# Patient Record
Sex: FEMALE | Race: WHITE
[De-identification: names, ages, dates, MRNs, and addresses within clinical notes are randomized per-mention and may not be internally consistent; named-entity substitution may affect disease eponyms.]

---

## 2018-04-25 ENCOUNTER — HOSPITAL ENCOUNTER (INPATIENT)
Dept: HOSPITAL 68 - ERH | Age: 58
LOS: 2 days | DRG: 754 | End: 2018-04-27
Attending: PSYCHIATRY & NEUROLOGY | Admitting: PSYCHIATRY & NEUROLOGY
Payer: COMMERCIAL

## 2018-04-25 VITALS — SYSTOLIC BLOOD PRESSURE: 135 MMHG | DIASTOLIC BLOOD PRESSURE: 79 MMHG

## 2018-04-25 VITALS — SYSTOLIC BLOOD PRESSURE: 145 MMHG | DIASTOLIC BLOOD PRESSURE: 91 MMHG

## 2018-04-25 VITALS — HEIGHT: 65 IN | BODY MASS INDEX: 20.7 KG/M2 | WEIGHT: 124.25 LBS

## 2018-04-25 VITALS — SYSTOLIC BLOOD PRESSURE: 133 MMHG | DIASTOLIC BLOOD PRESSURE: 70 MMHG

## 2018-04-25 VITALS — DIASTOLIC BLOOD PRESSURE: 84 MMHG | SYSTOLIC BLOOD PRESSURE: 135 MMHG

## 2018-04-25 VITALS — SYSTOLIC BLOOD PRESSURE: 135 MMHG | DIASTOLIC BLOOD PRESSURE: 84 MMHG

## 2018-04-25 VITALS — DIASTOLIC BLOOD PRESSURE: 91 MMHG | SYSTOLIC BLOOD PRESSURE: 145 MMHG

## 2018-04-25 DIAGNOSIS — F32.9: Primary | ICD-10-CM

## 2018-04-25 DIAGNOSIS — Z72.89: ICD-10-CM

## 2018-04-25 LAB
ABSOLUTE GRANULOCYTE CT: 5.4 /CUMM (ref 1.4–6.5)
BASOPHILS # BLD: 0.1 /CUMM (ref 0–0.2)
BASOPHILS NFR BLD: 0.8 % (ref 0–2)
EOSINOPHIL # BLD: 0.1 /CUMM (ref 0–0.7)
EOSINOPHIL NFR BLD: 0.6 % (ref 0–5)
GRANULOCYTES NFR BLD: 61.5 % (ref 42.2–75.2)
HCT VFR BLD CALC: 42 % (ref 37–47)
LYMPHOCYTES # BLD: 3 /CUMM (ref 1.2–3.4)
MONOCYTES # BLD: 0.3 /CUMM (ref 0.1–0.6)
PLATELET # BLD: 496 /CUMM (ref 130–400)
PMV BLD AUTO: 6.5 FL (ref 7.4–10.4)
WBC # BLD AUTO: 8.8 /CUMM (ref 4.8–10.8)

## 2018-04-25 PROCEDURE — G0480 DRUG TEST DEF 1-7 CLASSES: HCPCS

## 2018-04-25 NOTE — IP CRISIS DIAG ASSESS PSYCH
Diagnostic Assessment
 
Basic Assessment
Insurance Authorization:
Insurance #1:
 
Insurance name: SELF-PAY
 
Phone number: 
 
Policy number: [id number]
Group number: 
Authorization number:    submitted.
 
 
Primary Care Physician:
Patient's PCP: Bernard Johnsno MD
PCP's Phone Number: [phone]
 
Patient's Quote: "I am screwed financially"
Present Illness:
Patient is a 57 year old , who called ambulance after she had taken Chantix
(she thinks 60 tabs) and 2  percocets, after having had a considerable amount to
drink in order to "get up the courage to take the pills. Patient staes that she 
is, and has been, financilly stressed for a number of years, and that her 
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 
care, is going to be reporting her income to the IRS, after having agreed that 
she would not report such income.  Patient reports that she just barely gets by 
on the money that she has, and that she has no insurance, and has no idea how 
she can pay the IRS.    Patient lives alone, since her  had  about 15
years ago. Patient has minimal pension from  ($200.00/month), and has a 
number of physical problems including serious pain, osteoporosis, and pain from 
a broken arm years ago. Patient states that she sees herself in a wheelchair in 
the future, and indicates that she has no one to help her, nor provide any real 
support.
Patient reports having had 2 relationships since   when she was 38, 
but states that "one broke my heart, and the other was not a good match". 
Patient states that gradually her friends drifted away after husbands death, as 
they were couples. She also states that dating world is not "a good place for 
someone like me to be".  Patient states that her family are involved with their 
own problems, and she is horrified that they will find out, and does not want to
cause them any problems.
Patient had seen a psychiatrist after her   suddenly from a heart 
attack, and was on anti-depressant. She improved over a couple years and 
stopped.  No other psychiatric treatment.
Patient had some physical pain, and was on percocet and fentanyl, but weaned 
self off.  Patient states that she does have 2 drinks (vodka) many nights, and 
this has occurred over the past 7 years. Patient denies any other substance use,
stating that she had had 2 "left over percocets" that she took last night.
Patient is fully alert and oriented x 3.
Patient's Address:
[address] [place] [address]
Home Phone Number: [phone]
Other Phone Number: 
 
Who Do You Live With? Patient/Self
Feel Safe Where You Live? Yes
Feel Safe in Your Relationship Yes
Marital Status: 
Do You Have Children? No
Primary Language? English
Language(s) Spoken At Home: English
Family/Informants Interviewed: brother, Luis Fuller
Allergies -
Coded Allergies:
Iodinated Contrast- Oral and IV Dye (UNKNOWN 18)
amoxicillin (UNKNOWN 18)
niacin (FELT LIKE BODY ON FIRE FOR HOURS, FAINTING 18)
 
Current Medications -
Scheduled Medications
Ascorbic Acid (Vitamin C) 500 MG TAB   1 TAB PO DAILY SUPPLEMENT  (Reported)
     Entered as Reported by Daja Catherine on 12/29/15 2148
Calcium Carbonate (Calcium 600) 600 MG TAB   2,000 MG PO DAILY SUPPLEMENT  (
Reported)
     Entered as Reported by Daja Catherine on 12/29/15 2148
Cyanocobalamin (Vitamin B-12) (Unknown Strength) TAB   (Unknown Dose) PO DAILY 
SUPPLEMENT  (Reported)
     Entered as Reported by Daja Catherine on 12/29/15 2147
Magnesium (Unknown Strength) TAB   (Unknown Dose) PO DAILY SUPPLEMENT  (Reported
)
     Entered as Reported by Daja Catherine on 12/29/15 2147
 
Scheduled PRN Medications
Albuterol Sulfate (Albuterol Sulfate Hfa) 0.09 MG/Actuation JOSEPH   2 PUFF INH Q4-
6 PRN PRN SHORTNESS OF BREATH #1 JOSEPH
     Prescribed by Holley Oh on 16
 
Consequences of Psych Med Use:
not on meds
Lab Results:
 Laboratory Tests
 
18 0335:
Urine Opiates Screen 963.00, Methadone Screen < 40, Barbiturate Screen < 60, Ur 
Phencyclidine Scrn < 6.00, Amphetamines Screen < 100, U Benzodiazepines Scrn < 
85, Urine Cocaine Screen < 50, Urine Cannabis Screen 33.00, Urine Color YEL, 
Urine Clarity CLEAR, Urine pH 6.0, Ur Specific Gravity >= 1.030, Urine Protein 
NEG, Urine Ketones 15  H, Urine Nitrite NEG, Urine Bilirubin NEG, Urine 
Urobilinogen 0.2, Ur Leukocyte Esterase NEG, Ur Microscopic EXAM NOT REQUIRED, 
Urine Hemoglobin NEG, Urine Glucose NEG
 
18 0210:
Anion Gap 23  H, Estimated GFR > 60, BUN/Creatinine Ratio 20.0, Glucose 129  H, 
Hemoglobin A1c 5.1, Calcium 9.5, Total Bilirubin 0.8, AST 28, ALT 20, Alkaline 
Phosphatase 95, Troponin I < 0.01, Total Protein 7.6, Albumin 4.9, Globulin 2.7,
Albumin/Globulin Ratio 1.8, Triglycerides 440  H, Cholesterol 152, LDL 
Cholesterol Direct 46.22, LDL Cholesterol, Calc ND, HDL Cholesterol 55, 
Cholesterol/HDL Ratio 3, TSH &T3 &Free T4 Intrp 2.050, CBC w Diff NO MAN DIFF 
REQ, MPV 6.5  L, Gran % 61.5, Lymphocytes % 33.8, Monocytes % 3.3, Eosinophils %
0.6, Basophils % 0.8, Absolute Granulocytes 5.4, Absolute Lymphocytes 3.0, 
Absolute Monocytes 0.3, Absolute Eosinophils 0.1, Absolute Basophils 0.1, 
Acetaminophen < 10.0  L, Serum Alcohol 230.0
 
Toxicology Screen Completed? Yes
Results: positive
Symptoms of Use:
patient drinks 4-5 nights per week.
 
patient also took 2 percocets from previous prescription
 
Past History
 
Past Medical History
Medical History: osteoporsis 
 
Abuse/Trauma History
Trauma History/Current Trauma: Denies
 
Legal History
Current Legal Status: none
Have you ever been arrested? No
Number of Arrests: 0
 
Psychosocial History
Strengths/Capabilities:
patient has been employed for many years
patient has been self reliant
Physical Limitations (Interventions):
has mobility issues
 
Psychiatric Treatment History
Psych Treatment
   Psychiatric Treatment Yes
   Inpatient Treatment No
   Outpatient Treatment Yes
   Location of Treatment [place]
   Reason for Treatment
husbands sudden death.
   Dates of Treatment 8524-3177
   Response to Treatment
good
Diagnosis by History:
depression
 adjustment issues
Risk Factors: access to lethal means, high anxiety/distress, isolate/no social 
support, lives alone, limited support
 
Substance Use/Abuse History
Drug Use/Abuse minimum 12mo Hx
   Substances Used/Abused Yes
   Substance Used/Abused Alcohol
   First Use 16
   Last Used yesterday
   How much used/taken patient states has 2 drinks vodka at night, 4-5 nights/
wk.
   How often 4-5 nights/wk.
   For how long past 7 years
   Route of use p.o.
 
Substance Abuse Treatment
Substance Abuse Treatment
   Past Substance Abuse [place] No
 
Sexual History
Sexually Active No
Sexual Orientation Heterosexual
 
Current Mental Status
 
Mental Status
Orientation: Person, Place, Situation
Affect: Anxious, Sad
Speech: WNL
Neuro-vegetative: WNL
 
Appearance
Appearance- Dress/Hygiene:
appears stated age
 
Behaviors
Thought Process: Logical/Rational
Thought Content: WNL
Memory: WNL
Insight: Fair
 
SI/HI Risk Assessment
- Minimum 6mo History-
Past Suicidal Ideation/Attempts No
Current Suicidal Ideation/Att Yes
Past Homicidal Ideation/Att: No
Current Homicidal Ideation/Attempts No
Degree of Intent: Made Preparations (took overdose)
Danger To: Self
Risk Factors: access to lethal means, high anxiety/distress, isolate/no social 
support, lives alone, limited support
Lethality Rating: 3
Needs/Init [place] Plan/Goals:
Admit to [facility] due to overdose. Danger to self
Medication and psychiatric evaluation.
Group and individual therapy
Arrange family meeting, although patient is resistant
at present
assist patient with insurance application
coordinate follow-up treatment, with consideration of financial situation and 
obligations.
AUDIT-C Questionnaire:
 
 
AUDIT-C Questionnaire: Response Value
 
ETOH use in the past year 4 or more per week 4
 
# drinks typical/day 3 or 4 1
 
6 or > drinks per occasion Less than monthly 1
 
Total   6
 
 
 
DSM5/PS Stressors/Medical Prob
Diagnosis' (DSM 5, Stressors, Medical):
Unspecified Depressive D.O.   F 32.9
Current GAF: 27
Comments:
patient has many stressors

## 2018-04-25 NOTE — ED PSYCHIATRIC COMPLAINT
**See Addendum**
History of Present Illness
 
General
Chief Complaint: Psychiatric Related Complaint
Stated Complaint: SI
Source: patient, old records, EMS
Exam Limitations: no limitations, intoxication
 
Vital Signs & Intake/Output
Vital Signs & Intake/Output
 Vital Signs
 
 
Date Time Temp Pulse Resp B/P B/P Pulse O2 O2 Flow FiO2
 
     Mean Ox Delivery Rate 
 
04/25 0631 97.5 89 16 141/69  100 Room Air  
 
04/25 0430 96.2 66 18 109/63  96 Room Air  
 
04/25 0210       Room Air  
 
04/25 0206 96.4 66 18 126/64  97 Room Air  
 
 
 
Reconcile Medications
Albuterol Sulfate (Albuterol Sulfate Hfa) 0.09 MG/Actuation JOSEPH   2 PUFF INH Q4-
6 PRN PRN SHORTNESS OF BREATH
     90 MCG PER PUFF
Ascorbic Acid (Vitamin C) 500 MG TAB   1 TAB PO DAILY SUPPLEMENT  (Reported)
Calcium Carbonate (Calcium 600) 600 MG TAB   2,000 MG PO DAILY SUPPLEMENT  (
Reported)
Cyanocobalamin (Vitamin B-12) (Unknown Strength) TAB   (Unknown Dose) PO DAILY 
SUPPLEMENT  (Reported)
Magnesium (Unknown Strength) TAB   (Unknown Dose) PO DAILY SUPPLEMENT  (Reported
)
 
Triage Nurses Notes Reviewed? yes
HPI:
Patient took approximately 60 Chantix, 2 Percocet and 1 other pill that she 
doesn't know what it was as well as alcohol in an effort to kill herself.  
Patient states she did this because of income tax and that it is the IRS's 
fault.  Patient denies ever having attempted anything in the past.  Patient 
denies homicidal ideation.
(Pia VIGIL,Abelino CONNOLLY)
Allergies
Coded Allergies:
Iodinated Contrast- Oral and IV Dye (UNKNOWN 04/25/18)
amoxicillin (UNKNOWN 04/25/18)
niacin (FELT LIKE BODY ON FIRE FOR HOURS, FAINTING 04/25/18)
 
(Liu Schuler DO)
 
Past History
 
Travel History
Traveled to [place] past 21 day No
 
Medical History
Any Pertinent Medical History? see below for history
Neurological: NONE
EENT: NONE
Cardiovascular: NONE
Respiratory: asthma, bronchitis
Gastrointestinal: INDIGESTION
Hepatic: NONE
Renal: NONE
Musculoskeletal: fracture, osteoporosis
Psychiatric: alcohol dependence, anxiety, depression
Endocrine: NONE
Blood Disorders: NONE
Cancer(s): NONE
GYN/Reproductive: NONE
Other Medical Hx:
Polymyalgia rheumatica
History of MRSA: No
History of VRE: No
History of CDIFF: No
Influenza Vaccine: 12/30/15
 
Surgical History
Surgical History: left leg plate lumbar surgery neck and back fusion surgery (
Right LE ORIF)
 
Psychosocial History
Who do you live with Patient/Self
Services at Home None
What is your primary language English
Tobacco Use: Current Daily Use
Daily Tobacco Use Amount/Type: => 5 Cigarettes daily
ETOH Use: heavy use
Illicit Drug Use: denies illicit drug use
 
Family History
Hx Contributory? No
(Pia VIGIL,Abelino CONNOLLY)
 
Review of Systems
 
Review of Systems
Constitutional:
Reports: no symptoms. 
EENTM:
Reports: no symptoms. 
Respiratory:
Reports: no symptoms. 
Cardiovascular:
Reports: no symptoms. 
GI:
Reports: see HPI, nausea, vomiting. 
Genitourinary:
Reports: no symptoms. 
Musculoskeletal:
Reports: no symptoms. 
Skin:
Reports: no symptoms. 
Neurological/Psychological:
Reports: see HPI, depressed. 
Hematologic/Endocrine:
Reports: no symptoms. 
Immunologic/Allergic:
Reports: no symptoms. 
All Other Systems: Reviewed and Negative
(Pia VIGIL,Abelino CONNOLLY)
 
Physical Exam
 
Physical Exam
General Appearance: well developed/nourished, mild distress
Head: atraumatic
Eyes:
Bilateral: PERRL, EOMI. 
Ears, Nose, Throat: normal pharynx, normal ENT inspection, hearing grossly 
normal
Neck: normal inspection, supple
Respiratory: normal breath sounds
Cardiovascular: regular rate/rhythm
Gastrointestinal: soft, non-tender
Extremities: normal range of motion
Neurological/Psychiatric: no motor/sensory deficits, awake, alert, oriented x 3
Appearance/Memory/Insight: appropriate appearance, appropriate insight
Behavoir/Eye Contact/Speech: cooperative, normal speech, good eye contact
Thoughts/Hallucinations: normal thought pattern, no apparent hallucination
Skin: intact, normal color, warm/dry
SAD PERSONS Done? CRISIS CONSULT OBTAINED
(Pia VIGIL,Abelino CONNOLLY)
 
Progress
Differential Diagnosis: drug intoxication, drug overdose, drug withdrawal, 
electrolyte abnormality
Plan of Care:
 Orders
 
 
Procedure Date/time Status
 
Regular Diet 04/25 B Active
 
Add-on Test (ER Only) 04/25 0208 Active
 
Continuous Observation Monitor 04/25 0156 Active
 
URINE DRUGS OF ABUSE 04/25 0156 Complete
 
URINALYSIS 04/25 0156 Complete
 
ACETOMINOPHEN 04/25 0156 Complete
 
TROPONIN LEVEL 04/25 0156 Complete
 
ETHANOL 04/25 0156 Complete
 
COMPREHENSIVE METABOLIC PANEL 04/25 0156 Complete
 
CBC WITHOUT DIFFERENTIAL 04/25 0156 Complete
 
EKG 04/25 0156 Active
 
ED CRISIS PSYCH CONSULT 04/25 0156 Active
 
 
 Laboratory Tests
 
 
 
04/25/18 0335:
Urine Opiates Screen 963.00, Methadone Screen < 40, Barbiturate Screen < 60, Ur 
Phencyclidine Scrn < 6.00, Amphetamines Screen < 100, U Benzodiazepines Scrn < 
85, Urine Cocaine Screen < 50, Urine Cannabis Screen 33.00, Urine Color YEL, 
Urine Clarity CLEAR, Urine pH 6.0, Ur Specific Gravity >= 1.030, Urine Protein 
NEG, Urine Ketones 15  H, Urine Nitrite NEG, Urine Bilirubin NEG, Urine 
Urobilinogen 0.2, Ur Leukocyte Esterase NEG, Ur Microscopic EXAM NOT REQUIRED, 
Urine Hemoglobin NEG, Urine Glucose NEG
 
04/25/18 0210:
Anion Gap 23  H, Estimated GFR > 60, BUN/Creatinine Ratio 20.0, Glucose 129  H, 
Calcium 9.5, Total Bilirubin 0.8, AST 28, ALT 20, Alkaline Phosphatase 95, 
Troponin I < 0.01, Total Protein 7.6, Albumin 4.9, Globulin 2.7, Albumin/
Globulin Ratio 1.8, CBC w Diff NO MAN DIFF REQ, MPV 6.5  L, Gran % 61.5, 
Lymphocytes % 33.8, Monocytes % 3.3, Eosinophils % 0.6, Basophils % 0.8, 
Absolute Granulocytes 5.4, Absolute Lymphocytes 3.0, Absolute Monocytes 0.3, 
Absolute Eosinophils 0.1, Absolute Basophils 0.1, Acetaminophen < 10.0  L, Serum
Alcohol 230.0
 
Hand-Off
   Endorsed To:
Liu Schuler DO
   Endorsed Time: 0700
   Pending: consult
(Pia VIGIL,Abelino CONNOLLY)
 
Departure
 
Departure
Disposition: STILL A PATIENT
Condition: Stable
Clinical Impression
Primary Impression: Suicide attempt
Referrals:
Bernard Johnson MD (PCP/Family)
 
Departure Forms:
Customer Survey
General Discharge Information
(Pia VIGIL,Abelino CONNOLLY)
 
Departure
Comments
 
 
 
4/25/18
7:50 AM
The patient was signed out to me by Dr. Flores.  She status post Chantix 
overdose.  She's been medically cleared.  She is pending crisis evaluation.
(Liu Schuler DO)

## 2018-04-25 NOTE — HISTORY & PHYSICAL
General Information and HPI
MD Statement:
I have seen and personally examined SANDI VÁSQUEZ and documented this H&P.
 
The patient is a 57 year old F who presented with a patient stated chief 
complaint of [ medical evaluation ].
 
Source of Information: patient
Exam Limitations: no limitations
History of Present Illness:
 
57 Y O F with PMHx osteoporosis (premature menopause and Prednisone treatment in
the past for PMR), multiple fractures presented in ER for SI and depression. She
took approximately 60 Chantix, 2 Percocet and 1 other pill that she doesn't 
know. No complains. currently feeling better, no SI, HI or hallucinations. 
 
She smoke 1/2 to 1 PPD. 
She drinks 4 times a week, 2 drinks daily 
Denied illicit drugs. 
 
No CP/ Cough / fever/ chills / N/ V/ D / skin rash/ Urinary symptoms/ abdo pain.
 
 
 
Allergies/Medications
Allergies:
Coded Allergies:
Iodinated Contrast- Oral and IV Dye (UNKNOWN 04/25/18)
amoxicillin (UNKNOWN 04/25/18)
niacin (FELT LIKE BODY ON FIRE FOR HOURS, FAINTING 04/25/18)
 
Home Med list
Ascorbic Acid (Vitamin C) 500 MG TAB   1 TAB PO DAILY SUPPLEMENT  (Reported)
[CALCIUM] 1 TAB PO DAILY SUPPLEMENT  (Reported)
Cholecalciferol (Vitamin D3) (Vitamin D3) 400 UNIT TABLET   3 TAB PO DAILY 
SUPPLEMENT  (Reported)
Cyanocobalamin (Vitamin B-12) (Unknown Strength) TAB   (Unknown Dose) PO DAILY 
SUPPLEMENT  (Reported)
Magnesium (Unknown Strength) TAB   (Unknown Dose) PO DAILY SUPPLEMENT  (Reported
)
Multiple Vitamin (Multivitamins) 1 EACH TABLET   1 TAB PO DAILY SUPPLEMENT  (
Reported)
 
Compliance With Home Meds: GOOD
 
Past History
 
Travel History
Traveled to [place] past 21 day No
 
Medical History
Neurological: NONE
EENT: NONE
Cardiovascular: NONE
Respiratory: asthma, bronchitis, SMOKER
Gastrointestinal: GERD
Hepatic: NONE
Renal: NONE
Musculoskeletal: osteoporosis, FRACTURE RIGHT LEG 2011 FRACTURE RIGHT ARM 2016
Psychiatric: alcohol dependence, anxiety, depression
Endocrine: NONE
Blood Disorders: NONE
Cancer(s): NONE
GYN/Reproductive: NONE
Other Medical Hx:
Polymyalgia rheumatica
History of MRSA: No
History of VRE: No
History of CDIFF: No
Isolation History: Standard
Influenza Vaccine: 10/01/17
 
Surgical History
Surgical History: left leg plate lumbar surgery neck and back fusion surgery (
Right LE ORIF), Cx spine and Lumbar spine suregry 
 
Past Family/Social History
 
Family History
Relations & Conditions if any
MOTHER
  FH: colon cancer
  FH: stroke
FATHER
  FH: diabetes mellitus
 
 
Psychosocial History
Where do you live? Home
Who Do You Live With? self
Services at Home: None
Primary Language: English
Smoking Status: Current Everyday Smoker
ETOH Use: heavy use
Illicit Drug Use: denies illicit drug use
 
Functional Ability
ADLs
Independent: dressing, eating, toileting, bathing. 
Ambulation: independent
IADLs
Independent: shopping, housework, finances, food prep, telephone, transportation
, medication admin. 
 
Sexual History
Past Sexual History Unobtainable at this time
 
Employment History
Employment Employed
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
 
Review of Systems
 
Review of Systems
Constitutional:
Reports: no symptoms, see HPI. 
 
Exam & Diagnostic Data
Last 24 Hrs of Vital Signs/I&O
 Vital Signs
 
 
Date Time Temp Pulse Resp B/P B/P Pulse O2 O2 Flow FiO2
 
     Mean Ox Delivery Rate 
 
04/25 2008 99.2 97  145/91     
 
04/25 2000 99.2 97  145/91     
 
04/25 1725 99.5 78  135/84     
 
04/25 1723 99.5 78  135/84     
 
04/25 1542 98.0 80 18 135/79     
 
04/25 1541 98.0 80 18 135/79     
 
04/25 1339 98.4 81 18 133/70     
 
04/25 1315 98.4 81 18 133/70  97   
 
04/25 1029 98.0 97 18 124/73  97 Room Air  
 
04/25 0822 98.4 88 22 131/80  98 Room Air  
 
04/25 0631 97.5 89 16 141/69  100 Room Air  
 
04/25 0430 96.2 66 18 109/63  96 Room Air  
 
04/25 0210       Room Air  
 
04/25 0206 96.4 66 18 126/64  97 Room Air  
 
 
 Intake & Output
 
 
 04/25 0000 04/25 0800 04/25 1600
 
Intake Total  2000 
 
Output Total   
 
Balance  2000 
 
    
 
Intake, IV  2000 
 
Patient  58.967 kg 
 
Weight   
 
 
 
 
Physical Exam
General Appearance Alert, Oriented X3, Cooperative, No Acute Distress
Skin No Rashes, No Breakdown
HEENT Atraumatic, PERRLA, EOMI, Mucous Membr. moist/pink
Neck Supple, No JVD, No thryomegaly
Lymphatic Axillary nl, Cervical nl
Cardiovascular Regular Rate, Normal S1, Normal S2, No Murmurs
Lungs Clear to Auscultation, Normal Air Movement
Abdomen Normal Bowel Sounds, Soft, No Tenderness
Neurological
   Exam Findings: Normal Gait, Normal Speech, Strength at 5/5 X4 Ext, Normal 
Tone, Sensation Intact, Cranial Nerves 3-12 NL, Reflexes 2+
   Cranial Nerves II through XII:
3 to 12 intact
Extremities No Clubbing, No Cyanosis, No Edema, Normal Pulses, No Tenderness/
Swelling
Vascular Normal Pulses, Pulses Symmetrical
Last 24 Hrs of Labs/Corbin:
 Laboratory Tests
 
04/25/18 0335:
Urine Pregnancy Test NEGATIVE
 
04/25/18 0335:
Urine Opiates Screen 963.00, Methadone Screen < 40, Barbiturate Screen < 60, Ur 
Phencyclidine Scrn < 6.00, Amphetamines Screen < 100, U Benzodiazepines Scrn < 
85, Urine Cocaine Screen < 50, Urine Cannabis Screen 33.00, Urine Color YEL, 
Urine Clarity CLEAR, Urine pH 6.0, Ur Specific Gravity >= 1.030, Urine Protein 
NEG, Urine Ketones 15  H, Urine Nitrite NEG, Urine Bilirubin NEG, Urine 
Urobilinogen 0.2, Ur Leukocyte Esterase NEG, Ur Microscopic EXAM NOT REQUIRED, 
Urine Hemoglobin NEG, Urine Glucose NEG
 
04/25/18 0210:
Anion Gap 23  H, Estimated GFR > 60, BUN/Creatinine Ratio 20.0, Glucose 129  H, 
Hemoglobin A1c 5.1, Calcium 9.5, Total Bilirubin 0.8, AST 28, ALT 20, Alkaline 
Phosphatase 95, Troponin I < 0.01, Total Protein 7.6, Albumin 4.9, Globulin 2.7,
Albumin/Globulin Ratio 1.8, Triglycerides 440  H, Cholesterol 152, LDL 
Cholesterol Direct 46.22, LDL Cholesterol, Calc ND, HDL Cholesterol 55, 
Cholesterol/HDL Ratio 3, TSH &T3 &Free T4 Intrp 2.050, CBC w Diff NO MAN DIFF 
REQ, MPV 6.5  L, Gran % 61.5, Lymphocytes % 33.8, Monocytes % 3.3, Eosinophils %
0.6, Basophils % 0.8, Absolute Granulocytes 5.4, Absolute Lymphocytes 3.0, 
Absolute Monocytes 0.3, Absolute Eosinophils 0.1, Absolute Basophils 0.1, 
Acetaminophen < 10.0  L, Serum Alcohol 230.0
 
 
Assessment/Plan
Assessment:
 
# SI and Depression : agree with Psych plan 
 
# Anion gap acidosis : secondary to alcohol, continue oral hydration 
 
# Cold agglutinin and Thrombocytosis : repeat CBC in Am 
 
# high Triglyceride : fasting lipid profile
 
# Osteoporosis : patient currently not on any treatment because of insurance 
issues.   
 
 
 
As Ranked By This Provider
Problem List:
 1. Suicide attempt
 
 2. Alcohol abuse
 
 3. Depression
 
 
Miscellaneous
 
Miscellaneous Documentation
Attending Case Discussed With:
Kp Varela MD
 
Primary Care Physician:
Bernard Johnson MD
 
Patient sees these Specialists
None 
Level of Patient Care: [facility]
 
Attending MD Review Statement
 
Attending Statement
Attending MD Statement: I interviewed and noted H and P
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
## 2018-04-25 NOTE — ED PSYCH CRISIS CONSULTATION
Crisis Consult
 
Basic Assessment
Date of Consult: 18
Responsible Person/Accompanied By: brotherLuis
Insurance Authorization:
Insurance #1:
 
Insurance name: SELF-PAY
Phone number: 
Policy number: 
Group number: 
Authorization number: 
 
 
ED Provider:
Patient's ED Provider: Liu Schuler DO
 
Primary Care Physician:
Patient's PCP: Bernard Johnson MD
PCP's Phone Number: [phone]
 
Current Psychiatrist: none
Chief Complaint: Psychiatric Related Financial stress
Patient's Quote: "I am screwed financially"
Present Illness:
Patient is a 57 year old , who called ambulance after she had taken Chantix
(she thinks 60 tabs) and 2  percocets, after having had a considerable amount to
drink in order to "get up the courage to take the pills. Patient staes that she 
is, and has been, financilly stressed for a number of years, and that her 
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 
care, is going to be reporting her income to the IRS, after having agreed that 
she would not report such income.  Patient reports that she just barely gets by 
on the money that she has, and that she has no insurance, and has no idea how 
she can pay the IRS.    Patient lives alone, since her  had  about 15
years ago. Patient has minimal pension from  ($200.00/month), and has a 
number of physical problems including serious pain, osteoporosis, and pain from 
a broken arm years ago. Patient states that she sees herself in a wheelchair in 
the future, and indicates that she has no one to help her, nor provide any real 
support.
Patient reports having had 2 relationships since   when she was 38, 
but states that "one broke my heart, and the other was not a good match". 
Patient states that gradually her friends drifted away after husbands death, as 
they were couples. She also states that dating world is not "a good place for 
someone like me to be".  Patient states that her family are involved with their 
own problems, and she is horrified that they will find out, and does not want to
cause them any problems.
Patient had seen a psychiatrist after her   suddenly from a heart 
attack, and was on anti-depressant. She improved over a couple years and 
stopped.  No other psychiatric treatment.
Patient had some physical pain, and was on percocet and fentanyl, but weaned 
self off.  Patient states that she does have 2 drinks (vodka) many nights, and 
this has occurred over the past 7 years. Patient denies any other substance use,
stating that she had had 2 "left over percocets" that she took last night.
Patient is fully alert and oriented x 3.
Patient's Address:
[address] [place] [address]
Home Phone Number: [phone]
Other Phone Number: 
 
Who Do You Live With? Patient/Self
Family/Informants Interviewed: brother, Luis Fuller
Allergies -
Coded Allergies:
Iodinated Contrast- Oral and IV Dye (UNKNOWN 18)
amoxicillin (UNKNOWN 18)
niacin (FELT LIKE BODY ON FIRE FOR HOURS, FAINTING 18)
 
Current Medications -
Scheduled Medications
Ascorbic Acid (Vitamin C) 500 MG TAB   1 TAB PO DAILY SUPPLEMENT  (Reported)
     Entered as Reported by Daja Catherine on 12/29/15 2148
Calcium Carbonate (Calcium 600) 600 MG TAB   2,000 MG PO DAILY SUPPLEMENT  (
Reported)
     Entered as Reported by Daja Catherine on 12/29/15 2148
Cyanocobalamin (Vitamin B-12) (Unknown Strength) TAB   (Unknown Dose) PO DAILY 
SUPPLEMENT  (Reported)
     Entered as Reported by Daja Catherine on 12/29/15 2147
Magnesium (Unknown Strength) TAB   (Unknown Dose) PO DAILY SUPPLEMENT  (Reported
)
     Entered as Reported by Daja Catherine on 12/29/15 2147
 
Scheduled PRN Medications
Albuterol Sulfate (Albuterol Sulfate Hfa) 0.09 MG/Actuation JOSEPH   2 PUFF INH Q4-
6 PRN PRN SHORTNESS OF BREATH #1 JOSEPH
     Prescribed by Holley Oh on 16
 
Laboratory Results:
 Laboratory Tests
 
18 0335:
Urine Opiates Screen 963.00, Methadone Screen < 40, Barbiturate Screen < 60, Ur 
Phencyclidine Scrn < 6.00, Amphetamines Screen < 100, U Benzodiazepines Scrn < 
85, Urine Cocaine Screen < 50, Urine Cannabis Screen 33.00, Urine Color YEL, 
Urine Clarity CLEAR, Urine pH 6.0, Ur Specific Gravity >= 1.030, Urine Protein 
NEG, Urine Ketones 15  H, Urine Nitrite NEG, Urine Bilirubin NEG, Urine 
Urobilinogen 0.2, Ur Leukocyte Esterase NEG, Ur Microscopic EXAM NOT REQUIRED, 
Urine Hemoglobin NEG, Urine Glucose NEG
 
18 0210:
Anion Gap 23  H, Estimated GFR > 60, BUN/Creatinine Ratio 20.0, Glucose 129  H, 
Calcium 9.5, Total Bilirubin 0.8, AST 28, ALT 20, Alkaline Phosphatase 95, 
Troponin I < 0.01, Total Protein 7.6, Albumin 4.9, Globulin 2.7, Albumin/
Globulin Ratio 1.8, CBC w Diff NO MAN DIFF REQ, MPV 6.5  L, Gran % 61.5, 
Lymphocytes % 33.8, Monocytes % 3.3, Eosinophils % 0.6, Basophils % 0.8, 
Absolute Granulocytes 5.4, Absolute Lymphocytes 3.0, Absolute Monocytes 0.3, 
Absolute Eosinophils 0.1, Absolute Basophils 0.1, Acetaminophen < 10.0  L, Serum
Alcohol 230.0
 
 
Past History
 
Past Medical History
Neurological: NONE
EENT: NONE
Cardiovascular: NONE
Respiratory: asthma, bronchitis
Gastrointestinal: INDIGESTION
Hepatic: NONE
Renal: NONE
Musculoskeletal: fracture, osteoporosis
Psychiatric: alcohol dependence, anxiety, depression
Endocrine: NONE
Blood Disorders: NONE
Cancer(s): NONE
GYN/Reproductive: NONE
 
Past Surgical History
Surgical History: left leg plate lumbar surgery neck and back fusion surgery (
Right LE ORIF)
 
Psychosocial History
Strengths/Capabilities:
patient has been employed for many years
patient has been self reliant
Physical Limitations (Interventions):
has mobility issues
 
Psychiatric Treatment History
Psych Treatment
   Psychiatric Treatment Yes
   Inpatient Treatment No
   Outpatient Treatment Yes
   Location of Treatment [place]
   Reason for Treatment
husbands sudden death.
   Dates of Treatment [id number]
   Response to Treatment
good
Diagnosis by History:
depression
adjustment issues
 
Substance Use/Abuse History
Drug Use/Abuse
   Substances Used/Abused Yes
   Substance Used/Abused Alcohol
   First Use 17
   Last Used yesterday
   How much used/taken patient states has 2 drinks vodka at night, 4-5 nights/
wk.
   How often 4-5 nights/wk.
   For how long past 7 years
   Route of use p.o.
 
Substance Abuse Treatment
Substance Abuse Treatment
   Past Substance Abuse [place] No
 
Current Mental Status
 
Mental Status
Orientation: Person, Place, Situation
Affect: Anxious, Sad
Speech: WNL
Neuro-vegetative: WNL
 
Appearance
Appearance- Dress/Hygiene:
appears stated age
 
Behaviors
Thought Process: Logical/Rational
Thought Content: WNL
Memory: WNL
Insight: Fair
 
SI/HI Risk Assessment
Past Suicidal Ideation/Attempts No
Current Suicidal Ideation/Att Yes
Past Homicidal Ideation/Att: No
Current Homicidal Ideation/Attempts No
Degree of Intent: Made Preparations (took overdose)
Danger To: Self
Risk Factors: access to lethal means, high anxiety/distress, isolate/no social 
support, lives alone, limited support
Lethality Rating: 3
 
PTSD Checklist
PTSD Done? patient declined
 
ED Management
Sitter: Yes
Restraints: No
 
DSM5/PS Stressors/Medical Prob
Diagnosis' (DSM 5, Stressors, Medical):
Unspecified Depressive D.O.   F 32.9
Current GAF: 27
Comments:
patient has many stressors
 
Departure
 
Disposition
Psych Medical Clearance
   Date: 18
   Medically Cleared at: 1110
   Time Started: 1115
   Time Ended: 1200
   Psychiatrist Consulted: Kp Varela MD
Date Disposition Established: 18
Time Disposition Established: 1255
Plan for Disposition -
   Modality: Inpatient Psychiatry
   Facility: [facility]
   Telephone: [phone]
Rationale for Disposition:
patient s/p overdose and suicide attempt
Referrals
Bernard Johnson MD (PCP/Family)

## 2018-04-26 VITALS — DIASTOLIC BLOOD PRESSURE: 78 MMHG | SYSTOLIC BLOOD PRESSURE: 145 MMHG

## 2018-04-26 VITALS — SYSTOLIC BLOOD PRESSURE: 140 MMHG | DIASTOLIC BLOOD PRESSURE: 96 MMHG

## 2018-04-26 VITALS — SYSTOLIC BLOOD PRESSURE: 147 MMHG | DIASTOLIC BLOOD PRESSURE: 92 MMHG

## 2018-04-26 VITALS — DIASTOLIC BLOOD PRESSURE: 96 MMHG | SYSTOLIC BLOOD PRESSURE: 140 MMHG

## 2018-04-26 VITALS — SYSTOLIC BLOOD PRESSURE: 145 MMHG | DIASTOLIC BLOOD PRESSURE: 90 MMHG

## 2018-04-26 VITALS — DIASTOLIC BLOOD PRESSURE: 90 MMHG | SYSTOLIC BLOOD PRESSURE: 145 MMHG

## 2018-04-26 LAB
ABSOLUTE GRANULOCYTE CT: 6.8 /CUMM (ref 1.4–6.5)
BASOPHILS # BLD: 0 /CUMM (ref 0–0.2)
BASOPHILS NFR BLD: 0.4 % (ref 0–2)
EOSINOPHIL # BLD: 0.1 /CUMM (ref 0–0.7)
EOSINOPHIL NFR BLD: 0.9 % (ref 0–5)
ERYTHROCYTE [DISTWIDTH] IN BLOOD BY AUTOMATED COUNT: 13.2 % (ref 11.5–14.5)
GRANULOCYTES NFR BLD: 69.2 % (ref 42.2–75.2)
HCT VFR BLD CALC: 38.8 % (ref 37–47)
LYMPHOCYTES # BLD: 2.2 /CUMM (ref 1.2–3.4)
MCH RBC QN AUTO: 32.8 PG (ref 27–31)
MCHC RBC AUTO-ENTMCNC: 34.5 G/DL (ref 33–37)
MCV RBC AUTO: 95.1 FL (ref 81–99)
MONOCYTES # BLD: 0.7 /CUMM (ref 0.1–0.6)
PLATELET # BLD: 387 /CUMM (ref 130–400)
PMV BLD AUTO: 7.2 FL (ref 7.4–10.4)
RED BLOOD CELL CT: 4.08 /CUMM (ref 4.2–5.4)
WBC # BLD AUTO: 9.9 /CUMM (ref 4.8–10.8)

## 2018-04-26 NOTE — SOCIAL WORKER PROG NOTE PSYCH
Social Work Progress Note
Progress Note
Afshan stated she "freaked out" over issues with money and that's why she drank
alot and took the overdose of Chantix.  She reported that the Chantix amount was
actually less than what she originally reported to Crisis when she came in.  She
now reports it was about 20 pills.  She said she did something really "stupid" 
and that she was just feeling tired of "just surviving" at the time.  She was 
referring to her financial issues.  She shared that she is being reported to the
IRS for not paying taxes the past 2 years.  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 
working for that was paying her under the table reported it.  She said they 
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  She states she 
normally is positive and says things alway have a way of working themselves out 
and she gets through it.  She doesn't know why she reacted this way and stated 
it is not her normal response to things.  She feels embarrassed, "mortified" 
over all of this.  She doesn't want anyone to know.  She specifically stated her
family will not know about this.  She was also upset that her employer ([employer]) called for her on the patient phone.  She refused to come to the
phone and was upset that they had called the unit and found out that she was 
here.  She is opting out of anyone knowing she is here.  
I tried to talk with her about how telling family or friends about what she is 
going through might be helpful.  She said she told one of her sister's about her
financial issues, but not everyone is aware.  
Asked her if she would want to try and get back on insurance while she is here. 
She stated she was not open to doing that right now, because she needs to get a 
1099 form from her employer to acurately report her income.  She is afraid they 
will be taking a close look at her due to everything happening with the state 
right now.

## 2018-04-26 NOTE — CPS PROVIDER INIT ASMT PSYCH
Psychiatric Admission
Crisis Worker's Note Reviewed: Yes
Patient Seen and Examined: Yes
Identifying Information:
Afshan Padilla is a 57-year-old  female 
Chief Complaint:
"I am screwed financially"
Reaction to Hospitalization:
pt. admitted voluntarily
 
History of Present Illness
Onset of Illness:
called ambulance after she had taken Chantix (she thinks 60 tabs) and 2  
percocets, after having had a considerable amount to drink in order to "get up 
the courage to take the pills. Patient staes that she is, and has been, 
financilly stressed for a number of years, and that her employer, a family 
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 
reporting her income to the IRS, after having agreed that she would not report 
such income.  Patient reports that she just barely gets by on the money that she
has, and that she has no insurance, and has no idea how she can pay the IRS.    
Patient lives alone, since her  had  about 15 years ago. Patient has 
minimal pension from  ($200.00/month), and has a number of physical 
problems including serious pain, osteoporosis, and pain from a broken arm years 
ago. Patient states that she sees herself in a wheelchair in the future, and 
indicates that she has no one to help her, nor provide any real support.
Circumstances Leading to Admission:
Overdose on Chantix and Percocets
Problem(s) Justifying Need for Admission:
Overdose on Chantix and Percocets
Other HPI:
Patient reports having had 2 relationships since   when she was 38, 
but states that "one broke my heart, and the other was not a good match". 
Patient states that gradually her friends drifted away after husbands death, as 
they were couples. She also states that dating world is not "a good place for 
someone like me to be".  Patient states that her family are involved with their 
own problems, and she is horrified that they will find out, and does not want to
cause them any problems.
Patient had seen a psychiatrist after her   suddenly from a heart 
attack, and was on anti-depressant. She improved over a couple years and 
stopped.  No other psychiatric treatment.
Patient had some physical pain, and was on percocet and fentanyl, but weaned 
self off.  Patient states that she does have 2 drinks (vodka) many nights, and 
this has occurred over the past 7 years. Patient denies any other substance use,
stating that she had had 2 "left over percocets" that she took last night.
 
Past Psychiatric History
Past Diagnosis(es)- if any:
depression
adjustment issues
Past Precipitating Factors- if any:
Mood seems that drinking was at least partly part of the precipitating factors. 
Also financial difficulties.
- Include inpatient and outpatient treatment
Treatment History:
Jen the patient received the outpatient psychiatric treatment in the [facility] in  and  reportedly following her 's 
sudden death.
History of Suicide Attempts or Gestures
Reportedly this was her first suicide attempt.
Substance Abuse History:
Alcohol use disorder.  Denied abusing opiates.
Allergies:
Coded Allergies:
Iodinated Contrast- Oral and IV Dye (UNKNOWN 18)
amoxicillin (UNKNOWN 18)
niacin (FELT LIKE BODY ON FIRE FOR HOURS, FAINTING 18)
 
Home Med List:
Ascorbic Acid (Vitamin C) 500 MG TAB   1 TAB PO DAILY SUPPLEMENT  (Reported)
     Entered as Reported by Daja Catherine on 12/29/15 2148
Calcium Carbonate (Calcium 600) 600 MG TAB   2,000 MG PO DAILY SUPPLEMENT  (
Reported)
     Entered as Reported by Daja Catherine on 12/29/15 2148
Cyanocobalamin (Vitamin B-12) (Unknown Strength) TAB   (Unknown Dose) PO DAILY 
SUPPLEMENT  (Reported)
     Entered as Reported by Daja Catherine on 12/29/15 2147
Magnesium (Unknown Strength) TAB   (Unknown Dose) PO DAILY SUPPLEMENT  (Reported
)
     Entered as Reported by Daja Catherine on 12/29/15 2147
 
Scheduled PRN Medications
Albuterol Sulfate (Albuterol 
- Include any medical condition(s) that may
- impact the patient's recovery/remission
Past Medical History:
Osteoporosis due to premature menopause and past prednisone treatment history of
fractures.
 
Past History
 
Medical History
Neurological: NONE
EENT: NONE
Cardiovascular: NONE
Respiratory: asthma, bronchitis, SMOKER
Gastrointestinal: GERD
Hepatic: NONE
Renal: NONE
Musculoskeletal: osteoporosis, FRACTURE RIGHT LEG 2011 FRACTURE RIGHT ARM 2016
Psychiatric: alcohol dependence, anxiety, depression
Endocrine: NONE
Blood Disorders: NONE
Cancer(s): NONE
GYN/Reproductive: NONE
Other Medical Hx:
Polymyalgia rheumatica
History of MRSA: No
History of VRE: No
History of CDIFF: No
Isolation History: Standard
Influenza Vaccine: 10/01/17
 
Surgical History
Surgical History: orthopedic surgeries multiple
 
Psychiatric Family/Social Hx
 
Family History
Psychiatric Illness:
unknown
Substance Use:
unknown
Suicides:
unknown
 
Social History
Living Situation:
Patient lives alone
Significant Relationships (family/friends):
Her brother Luis Fuller
Education:
not explored
Vocation/Occupation:
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
Legal:
denied current legal entanglements
 
Healthly Behaviors Screening
 
Tobacco Screening
Tobacco Use from ED Docu: Current Daily Use
Daily Tobacco Use Amount/Type: => 5 Cigarettes daily
- If tobacco counseling indicated
- the following topics are required.
- #1 Recognizing dangerous situations.
- #2 Coping Skills.
- #3 Basic information about quitting.
Status of Tobacco Cessation Counseling: #1, #2 AND #3 Completed
Cessation Med Status Nicotine Patch Ordered
 
Alcohol Screening
- ETOH screen POS if BAL >=80 or Audit-C>= M4/F3
Audit-C Score from Diag Assess: 6
Blood Alcohol Level:
Laboratory Tests
 
 
 
 
 0210
 
Toxicology 
 
  Serum Alcohol (<10 MG/DL) 230.0
 
 
 
Alcohol Use Screening Results: Pos per Audit C &/or BAL
- If ETOH counseling indicated
- the following topics are required.
- #1 Express concern about the patient's
- drinking at unhealthy levels, include informing
- of national norms for moderate drinking:
- men <= 14 drinks/week, max 4 drinks/occasion
- women <= 7 drinks/week, max 3 drinks/occasion
- #2 Providing feedback, including linking alcohol to
- negative physical effects (liver injury, hypertension)
- negative emotional effects (relationship problems and
- depression)
- negative occupational consequences (reduced work
- performance)
- #3 Advising the patient to abstain from alcohol or
- to drink below national norms for moderate drinking
- (as listed above).
Status of ETOH Use Counseling: #1, #2 AND #3 Completed.
 
Metabolic Screening
- Screen if on a Neuroleptic Medication
- Metabolic screening should include:
- Blood Pressure, BMI, Glucose or Hgb A1c, & a
- Lipid profile from within the past 365 days.
Metabolic Screening
([X]) Not Applicable, patient not on a neuroleptic.
 
 
 
 
Exam and Plan
 
Mental Status Examination
Ambulation Status:
Mobile with a steady gait
Appearance:
Unremarkable
Attitude towards examiner:
Calm and cooperative
Psychomotor activity:
Normal psychomotor activity
Behavior:
No abnormal behaviors
Quality of speech:
Normal speech, not pressured, not slurred
Affect:
Good range of affect
Mood:
Depressed
Suicidal Ideation:
Denied thoughts of suicide today
Homicidal Ideation:
Denied thoughts of homicide today
Hallucinations:
The patient denied hallucinations, she did not seem to be responding to internal
stimuli.
Paranoid/Delusional Material:
The patient denies feeling paranoid, there were no delusions during the 
interview.
Difficulties with thought organization:
The patient did not have difficulties with thought organization, she was 
coherent.
Insight:
She seems to have partial insight.
Judgment:
She seems to have reasonable judgment.
Orientation:
She was alert and oriented to time, place, and person.
Cognition:
She is seems to have reasonable attention and concentration as well as good 
information processing.
Memory Function:
There was no evidence of short-term memory impairments.
Estimate of intellectual functioning:
Average
 
Assets/Strengths
Patient Identified Assets/Strengths:
The patient is intelligent, likable, and has been independent/self-reliant.
 
Impression/Plan
Impression and Plan:
A 57-year-old  white female who presented to the emergency room after an 
overdose on Chantix and Percocet.  She also was drinking at the time.  She 
reported that she has been struggling with significant financial stress and a 
very limited social supports.  This was her first suicide attempt.  She has had 
received outpatient treatment following the death of her  
- Include all active medical diagnosis that require tx
DSM 5 Diagnosis(es):
Unspecified depressive disorder
Alcohol use disorder
- Initial Tx Plan for Active Psych & Medical Conditions
Treatment Plan:
Inpatient psychiatric care with safety checks every 15 minutes
Nursing assessments, vital signs, and patient education and
Group therapy, milieu therapy, and activity therapy
Biopsychosocial assessment, collateral information, and aftercare planning by 
[facility]
The patient will be evaluated daily by a psychiatrist to reevaluate mental 
status and monitor medications
Continue monitoring for alcohol withdrawal with as needed doses of Ativan for 
signs of withdrawals.
Start Naprosyn 500 mg every morning
 
Reevaluate for possible discharge tomorrow
- Factors that would help patient function
- in a less restrictive setting.
Factors:
the patient will be discharged if she has 2 consecutive days without thoughts of
suicide

## 2018-04-27 VITALS — DIASTOLIC BLOOD PRESSURE: 88 MMHG | SYSTOLIC BLOOD PRESSURE: 141 MMHG

## 2018-04-27 VITALS — SYSTOLIC BLOOD PRESSURE: 143 MMHG | DIASTOLIC BLOOD PRESSURE: 85 MMHG

## 2018-04-27 NOTE — DISCHARGE SUMMARY REPORT-PSYCH
Visit Information
 
Visit Dates/Diagnosis'
Admission Date:
04/25/18
 
Discharge Date: 04/27/18
Reason for Admission:
overdose on Chantix
Psy Discharge Primary Diag: Unspecified Depressive DO
Psy Discharge Secondary Diag: Alcohol Use Disorder
 
Hospital Course
Significant Lab Findings:
There are no significant lab abnormalities
 
Course
Complications:
The patient did not have any complications when she was on the inpatient 
psychiatric unit
Consultations:
The patient had a history and physical examination by the internist.  Please 
reported to the patient's electronic record for the details of the H&P
Allergies:
Coded Allergies:
Iodinated Contrast- Oral and IV Dye (UNKNOWN 04/25/18)
amoxicillin (UNKNOWN 04/25/18)
niacin (FELT LIKE BODY ON FIRE FOR HOURS, FAINTING 04/25/18)
 
Hospital Course/TX Response:
Mental Status Examination
The patient was alert and oriented to time, place, and person.
She had steady gait.  She was calm and cooperative but focused on discharge.  
She showed normal psychomotor activity, there were no abnormal behaviors, Normal
speech, not pressured, not slurred, Good range of affect.  She reported that she
was anxious and depressed, she denied thoughts of suicide today, she denied 
thoughts of homicide, patient denied hallucinations, she did not seem to be 
responding to internal stimuli.
The patient denies feeling paranoid, there were no delusions during the 
interview.
The patient did not have difficulties with thought organization, she was 
coherent.
She seems to have partial insight. She seems to have reasonable judgment.
She is seems to have reasonable attention and concentration as well as good 
information processing.
There was no evidence of short-term memory impairments.
 
Assessment:
A 57-year-old  white female who presented to the emergency room after an 
overdose on Chantix and Percocet.  She also was drinking at the time.  She 
reported that she has been struggling with significant financial stress and a 
very limited social supports.  This was her first suicide attempt.  She has had 
received outpatient treatment following the death of her  1999
DSM 5 Diagnosis(es):
Unspecified depressive disorder
Alcohol use disorder
Treatment Plan:
Continue Inpatient psychiatric care with safety checks every 15 minutes
Nursing assessments, vital signs, and patient education and
Group therapy, milieu therapy, and activity therapy
Biopsychosocial assessment, collateral information, and aftercare planning [facility]
The patient will be evaluated daily by a psychiatrist to reevaluate mental 
status and monitor medications
Continue monitoring for alcohol withdrawal with as needed doses of Ativan for 
signs of withdrawals.
Continue Naprosyn 500 mg every morning
 
Discharge HBIPS
- Tobacco Use Treatment Offered
Post DC Medications Offered: Script Given-See Med List
Post DC Tobacco Treatment Plan: Refused Tobacco Tx Pgm
- EtOH/Drug Use D/O Treatment Offered
Post DC Medications Offered: Ref Med EtOH/Drug Use D/O
Post DC EtOH/SubAbuse TX Plan: Other SubAbuse/Dual Pgm
 
Metabolic Screening
- Screen if on a Neuroleptic Medication
- Metabolic screening should include:
- Blood Pressure, BMI, Glucose or Hgb A1c, & a
- Lipid profile from within the past 365 days.
Metabolic Screening
([X]) Not Applicable, patient not on a neuroleptic.
 
 
 
 
Discharge Instructions
 
General Discharge Information
Multiple Neuroleptics:
([X]) Not Applicable
      
   
 
Discharge Diet Regular
Discharge Activity Normal
DC Disposition:
Discharge home
Referrals
Ordered Referrals
Provider Referral 05/04/18
For Groups:
[[facility]]
 
[facility] intake appt. 5/4/18 8:30am
 
[address] [place] [id number]
 
[phone]
 
 
 
Prescriptions
Continue taking these medications:
Magnesium (Magnesium) (Unknown Strength) TAB
    Unknown Dose ORAL DAILY
    Comments:
       PER PT
 
Cyanocobalamin (Vitamin B-12) (Unknown Strength) TAB
    Unknown Dose ORAL DAILY
    Comments:
       PER PT
 
Ascorbic Acid (Vitamin C) 500 MG TAB
    1 Tablet ORAL DAILY
    Comments:
       PER PT 
 
Multiple Vitamin (Multivitamins) 1 EACH TABLET
    1 Tablet ORAL DAILY
 
[CALCIUM]
    1 Tablet ORAL DAILY
    Comments:
       1,000 MG
 
Cholecalciferol (Vitamin D3) (Vitamin D3) 400 UNIT TABLET
    3 Tablet ORAL DAILY
 
Start taking the following new medications:
Nicotine (Nicotine Patch) 14 MG/24 HOUR PATCH.TD24
    14 Milligram On the skin DAILY
    Qty = 14
    No Refills
 
Naproxen (Naproxen) 500 MG TABLET
    500 Milligram ORAL DAILY
    Qty = 14
    No Refills
 
 
Studies Pending at Discharge
None
Copies To:
[facility]

## 2018-04-27 NOTE — CP SOUTH PROGRESS NOTE PSYCH
Psych (Inpt) Progress Note
Progress Note
Mental Status Examination
The patient was alert and oriented to time, place, and person.
She had steady gait.  She was calm and cooperative but focused on discharge.  
She showed normal psychomotor activity, there were no abnormal behaviors, Normal
speech, not pressured, not slurred, Good range of affect.  She reported that she
was anxious and depressed, she denied thoughts of suicide today, she denied 
thoughts of homicide, patient denied hallucinations, she did not seem to be 
responding to internal stimuli.
The patient denies feeling paranoid, there were no delusions during the 
interview.
The patient did not have difficulties with thought organization, she was 
coherent.
She seems to have partial insight. She seems to have reasonable judgment.
She is seems to have reasonable attention and concentration as well as good 
information processing.
There was no evidence of short-term memory impairments.
 
Assessment:
A 57-year-old  white female who presented to the emergency room after an 
overdose on Chantix and Percocet.  She also was drinking at the time.  She 
reported that she has been struggling with significant financial stress and a 
very limited social supports.  This was her first suicide attempt.  She has had 
received outpatient treatment following the death of her  1999
DSM 5 Diagnosis(es):
Unspecified depressive disorder
Alcohol use disorder
Treatment Plan:
Continue Inpatient psychiatric care with safety checks every 15 minutes
Nursing assessments, vital signs, and patient education and
Group therapy, milieu therapy, and activity therapy
Biopsychosocial assessment, collateral information, and aftercare planning by 
[facility]
The patient will be evaluated daily by a psychiatrist to reevaluate mental 
status and monitor medications
Continue monitoring for alcohol withdrawal with as needed doses of Ativan for 
signs of withdrawals.
Continue Naprosyn 500 mg every morning
 
information processing.
Memory Function:
There was no evidence of short-term memory impairments.
Estimate of intellectual functioning:
Average
 
Assets/Strengths
Patient Identified Assets/Strengths:
The patient is intelligent, likable, and has been independent/self-reliant.
 
Impression/Plan
Impression and Plan:
A 57-year-old  white female who presented to the emergency room after an 
overdose on Chantix and Percocet.  She also was drinking at the time.  She 
reported that she has been struggling with significant financial stress and a 
very limited social supports.  This was her first suicide attempt.  She has had 
received outpatient treatment following the death of her  1999
- Include all active medical diagnosis that require tx
DSM 5 Diagnosis(es):
Unspecified depressive disorder
Alcohol use disorder
- Initial Tx Plan for Active Psych & Medical Conditions
Treatment Plan:
Inpatient psychiatric care with safety checks every 15 minutes
Nursing assessments, vital signs, and patient education and
Group therapy, milieu therapy, and activity therapy
Biopsychosocial assessment, collateral information, and aftercare planning by 
LCSW
The patient will be evaluated daily by a psychiatrist to reevaluate mental 
status and monitor medications
Continue monitoring for alcohol withdrawal with as needed doses of Ativan for 
signs of withdrawals.
Start Naprosyn 500 mg every morning
 
Reevaluate for possible discharge tomorrow
- Factors that would help patient function
- in a less restrictive setting.
Factors:

## 2018-04-27 NOTE — SOCIAL WORKER PROG NOTE PSYCH
Social Work Progress Note
Progress Note
Afshan, Dr. Gharaibeh and I met together this morning.  Talked to Afshan about
our reluctance to d/c today without family contact for additional collateral.  
Talked about the purpose of obtaining additional feedback and letting us do our 
job to ensure safety.  Also talked about how she is really rushing out of here 
and wanting to return to work.  Talked about the importance of reflecting on 
what happened.  She talked about how she continues to feel humilated about all 
of this and really didn't want to share that with family.  Acknowledged how she 
seemed like a strong resilant woman, but sometimes it is difficult to take all 
of the stress she has taken on without support.  She eventually agreed to have a
release signed for her Brother Luis Fluler who lives in [place].  His contact 
number is [phone].  She called him in Dr. Gharaibeh's office and left a 
detailed message regarding why she was admitted to the hospital.  During the 
call she broke down in tears.  She is fearful now that all of her family will 
know and they will think she is a "loser."  She talked about her willingness to 
follow up outpatient and her need to start going to AA.  She is hoping to still 
discharge today if we are able to connect with her brother.
 
Spoke with Marium () at [facility].  Gave her some updated 
clinical information.  They will give her an intake for 5/4 @ 8:30am.  They will
have their entitlements person help her with insurance if she needs it.  
 
Talked with Afshan's Brother Luis.  He was completely shocked over hearing of 
this incident.  He said she has never done anything like this before and he has 
never heard her talk of suicide.  I explained that she was very resistant to the
idea of sharing this with family, due to how embarrassed she was.  He said that 
he will be sharing this with her siblings because he feels that everyone should 
be supporting her.  He knew she was a little down, but had no idea of what she 
was dealing with.  He is somewhat aware of her financial situation due to what 
he has heard from his Sister, but not aware of the extent of what is happening. 
He is not opposed to her leaving the hospital today.  He is willing to check in 
via phone tonight and check in with her in person tomorrow.  I informed him of 
her follow up appts. at [facility].  He said he will also ensure she follows 
through.  Asked about her risks around drinking?  He said she over does it at 
times with drinking, but there have been no safety concerns around drinking.  I 
let him know she will most likely discharge today.

## 2018-04-27 NOTE — PATIENT DISCHARGE INSTRUCTIONS
Psych Discharge Inst
 
General Discharge Information
Reason for Admission:
overdose on Chantix
Psy Discharge Primary Diag+ Unspecified Depressive DO
Psy Discharge Secondary Diag+ Alcohol Use Disorder
Summary Tests/Major Procedures
 Lab
 
 
Serum Alcohol 230.0 MG/DL 04/25/18 0210
 
 
Studies Pending at DC:
None
 
Patient Instructions
Contact Information
Your Psychiatrist on [facility] was Gharaibeh MD,[place]
 
* If you are experiencing an emergency related to this hospitalization, please 
call [phone] to contact the treating psychiatrist or the psychiatrist-on-
call.
 
* To Request a copy of your medical records, please contact the Medical Records 
Department at [phone].
 
* To request results of studies pending at the time of discharge, please call 
[phone].
 
* Continue your Medications until directed to stop by your Healthcare provider.
 
General Medication Information
Please continue to take your new medications and your continued home medications
, unless otherwise indicated on your discharge medication list, or unless 
directed by your MD or APRN to stop them.
 
 
Special Instructions
Diet Regular
Activity Normal
- Tobacco Use Treatment Offered
Post DC Medications Offered: Script Given-See Med List
Post DC Tobacco Treatment Plan: Refused Tobacco Tx Pgm
- EtOH/Drug Use D/O Treatment Offered
Post DC Medications Offered: Ref Med EtOH/Drug Use D/O
Post DC EtOH/SubAbuse TX Plan: Other SubAbuse/Dual Pgm
Metabolic Screening
([X]) Not Applicable, patient not on a neuroleptic.
 
 
 
 
Advance Directives
Does the Patient have Medical Advance Directives No/Refused further info
Does Pt have Psychiatric Advance Directives? No/Refused further info
Does Patient have a Designated Surrogate Decision Maker: No
Information About Psychiatric Advance Directives Provided? Refused
 
Discharge Plan
Post Hospital Treatment Plan:
Please see the referral section

## 2018-04-27 NOTE — SOCIAL WORKER PROG NOTE PSYCH
Social Work Progress Note
Faxed Referral(s)
   Referred To: [facility]
   Transition of Care Documents sent: Health Summary
   Faxed to:  Care
   Fax #: [phone]
   Faxed by: Stephanie Toscano
   Date faxed: 04/27/18
   Time Faxed: [phone]

## 2018-04-30 NOTE — SOCIAL WORKER PROG NOTE PSYCH
Social Work Progress Note
Progress Note
**********************    DISCHARGE COMPLETED   **********************
                    Thank you. You have completed your discharge for this 
episode of care.
Member Name
Member ID
Member 
Subscriber Name
Subscriber ID
SANDI VÁSQUEZ
[id number]
1960
SANDI VÁSQUEZ
[id number]
 
Related Authorization  #
Related  Client Authorization #
Discharge  #
Discharge Date
[id number]
[id number] 
[id number]
2018
 
*******************************
The wrong discharge date was entered....The discharge date was entered as 2018 and should have been entered as 2018. ARIS called and spoke to Yan CERVANTES ([phone]) at [facility] and informed him of the error. Yan will be 
forwarding the information to the day staff to enter the correct discharge date.